# Patient Record
Sex: FEMALE
[De-identification: names, ages, dates, MRNs, and addresses within clinical notes are randomized per-mention and may not be internally consistent; named-entity substitution may affect disease eponyms.]

---

## 2022-03-31 ENCOUNTER — RESULT REVIEW (OUTPATIENT)
Age: 32
End: 2022-03-31

## 2024-04-25 PROBLEM — Z00.00 ENCOUNTER FOR PREVENTIVE HEALTH EXAMINATION: Status: ACTIVE | Noted: 2024-04-25

## 2024-05-03 ENCOUNTER — APPOINTMENT (OUTPATIENT)
Dept: ULTRASOUND IMAGING | Facility: CLINIC | Age: 34
End: 2024-05-03
Payer: COMMERCIAL

## 2024-05-03 ENCOUNTER — OUTPATIENT (OUTPATIENT)
Dept: OUTPATIENT SERVICES | Facility: HOSPITAL | Age: 34
LOS: 1 days | End: 2024-05-03

## 2024-05-03 ENCOUNTER — TRANSCRIPTION ENCOUNTER (OUTPATIENT)
Age: 34
End: 2024-05-03

## 2024-05-03 PROCEDURE — 76830 TRANSVAGINAL US NON-OB: CPT | Mod: 26

## 2024-05-03 PROCEDURE — 76856 US EXAM PELVIC COMPLETE: CPT | Mod: 26

## 2024-05-03 PROCEDURE — 76700 US EXAM ABDOM COMPLETE: CPT | Mod: 26

## 2024-05-06 ENCOUNTER — APPOINTMENT (OUTPATIENT)
Dept: OTOLARYNGOLOGY | Facility: CLINIC | Age: 34
End: 2024-05-06
Payer: COMMERCIAL

## 2024-05-06 VITALS — WEIGHT: 120 LBS | HEIGHT: 64 IN | BODY MASS INDEX: 20.49 KG/M2

## 2024-05-06 DIAGNOSIS — Z86.2 PERSONAL HISTORY OF DISEASES OF THE BLOOD AND BLOOD-FORMING ORGANS AND CERTAIN DISORDERS INVOLVING THE IMMUNE MECHANISM: ICD-10-CM

## 2024-05-06 DIAGNOSIS — L72.0 EPIDERMAL CYST: ICD-10-CM

## 2024-05-06 DIAGNOSIS — Z87.891 PERSONAL HISTORY OF NICOTINE DEPENDENCE: ICD-10-CM

## 2024-05-06 DIAGNOSIS — Z78.9 OTHER SPECIFIED HEALTH STATUS: ICD-10-CM

## 2024-05-06 DIAGNOSIS — K21.9 GASTRO-ESOPHAGEAL REFLUX DISEASE W/OUT ESOPHAGITIS: ICD-10-CM

## 2024-05-06 PROCEDURE — 99204 OFFICE O/P NEW MOD 45 MIN: CPT | Mod: 25

## 2024-05-06 PROCEDURE — 31575 DIAGNOSTIC LARYNGOSCOPY: CPT

## 2024-05-06 RX ORDER — FAMOTIDINE 20 MG/1
20 TABLET, FILM COATED ORAL
Qty: 90 | Refills: 0 | Status: ACTIVE | COMMUNITY
Start: 2024-05-06 | End: 1900-01-01

## 2024-05-06 RX ORDER — SERTRALINE HYDROCHLORIDE 25 MG/1
TABLET, FILM COATED ORAL
Refills: 0 | Status: ACTIVE | COMMUNITY

## 2024-05-06 RX ORDER — ALUMINUM HYDROXIDE AND MAGNESIUM CARBONATE 160; 105 MG/1; MG/1
160-105 TABLET, CHEWABLE ORAL
Qty: 100 | Refills: 3 | Status: ACTIVE | COMMUNITY
Start: 2024-05-06 | End: 1900-01-01

## 2024-05-07 NOTE — HISTORY OF PRESENT ILLNESS
[de-identified] : CC: tonsil cyst   HISTORY OF PRESENT ILLNESS: Ms. Trimble is a pleasant 33 year old lady with tonsil cyst issue for over a year, has seen other ENTs before and was told she has a tonsil stone she also reports significant throat burning and feeling of a lump in the neck nothing truly makes it better, but drinking cold beverages exacerbates her symptoms she has a history of GERD and took nexium before, not on any meds currently she's on zoloft and took a xanax prior to this appointment otherwise healthy   REVIEW OF SYSTEMS: General ROS: negative for - chills, fatigue or fever Psychological ROS: negative for - anxiety or depression Ophthalmic ROS: negative for - blurry vision, decreased vision or double vision ENT ROS: negative except as noted from HPI Allergy and Immunology ROS: negative except as noted from HPI Hematological and Lymphatic ROS: negative for - bleeding problems Endocrine ROS: negative for - malaise/lethargy Respiratory ROS: negative for - stridor Cardiovascular ROS: negative for - chest pain Gastrointestinal ROS: negative for - appetite loss or nausea/vomiting Genitourinary ROS: negative for - incontinence Musculoskeletal ROS: negative for - gait disturbance Neurological ROS: negative for - behavioral changes Dermatological ROS: negative for - nail changes   Physical Exam:   GENERAL APPEARANCE: Well-developed and No Acute Distress. COMMUNICATION: Able to Communicate. Strong Voice.   HEAD AND FACE Eyes: Testing of ocular motility including primary gaze alignment normal. Inspection and Appearance: No evidence of lesions or masses Palpation: Palpation of the face reveals no sinus tenderness Salivary Glands: Symmetric without masses Facial Strength: Symmetric without evidence of facial paralysis   EAR, NOSE, MOUTH, and THROAT: Ear Canals and Tympanic Membranes, Bilateral: No evidence of inflammation or lesions. Thresholds: Clinical speech reception thresholds normal. External, Nose and Auricle: No lesions or masses. Mouth: there is a inclusion cyst in the right mid tonsil maybe 3-5mm, not obstructive  NECK: Evaluation: No evidence of masses or crepitus. The neck is symmetric and the trachea is in the midline position. Thyroid: No evidence of enlargement, tenderness or mass. Neck Lymph Nodes: WNL. Respiratory: Inspection of the chest including symmetry, expansion and/or assessment of respiratory effort normal. Cardiovascular: Evaluation of peripheral vascular system by observation and palpation of capillary refill, normal. Neurological/Psychiatric: Alert, Oriented, Mood, and Affect Normal.   PROCEDURE: Flexible laryngoscopy with topical anesthesia (19402) ANESTHESIA: Topical with 4% Lidocaine  INDICATION FOR PROCEDURE: Unable to perform complete exam with mirror laryngoscopy  PREOPERATIVE DIAGNOSIS: flex  POSTOPERATIVE DIAGNOSIS: same as above  SURGEON: Baron Tanner MD   Prior to the procedure, I had a discussion with the patient regarding the risks, benefits, and alternatives of the procedure and a verbal consent was obtained.   INSTRUMENTS: Flexible scope   OPERATIVE PROCEDURE NOTE:  Patient was taken to the endoscopy suite where the nose and the pharynx were anesthetized with topical Pontocaine in a spray form. After adequate anesthesia of the nasal cavity and the pharynx, the fiberoptic endoscope was inserted through the nasal cavity. The palate was identified for patency. The nasopharynx, oropharynx, hypopharynx and the larynx were examined, along with the base of tongue. Structural examination of vocal cord movement was carried out and the larynx was examined during  phonation and deglutition. Gestures, such as cough, laughing and respiration were also performed to look for laryngeal abnormalities.  EXAM FINDINGS:  The nasal cavity mucosa was pink and normal.  No nasal cavity lesions or masses were observed.  No signs of infection or pus.  The nasopharynx was clear. No mass or lesion visualized.  The tongue was surface was found to be normal appearing.  No lesions or masses seen in the pharyngeal, hypopharyngeal, or laryngeal airway.  Mucosa appeared erythematous.  The vocal cords were mobile bilaterally.  Airway intact.  IMPRESSION: Ms. Trimble is a pleasant 33 year old lady with tonsil inclusion cyst, DNS to the right, LPR   PLAN: -reassurance -trial of pepcid/gaviscon, lifestyle changes (she drink quite a bit of coffee) -call back if symptoms persist will titrate to PPI     Baron Tanner MD [facility] Department of Otolaryngology- Head and Neck Surgery [facility]

## 2024-05-15 ENCOUNTER — RX RENEWAL (OUTPATIENT)
Age: 34
End: 2024-05-15

## 2024-06-26 ENCOUNTER — RX RENEWAL (OUTPATIENT)
Age: 34
End: 2024-06-26

## 2024-07-23 ENCOUNTER — ASOB RESULT (OUTPATIENT)
Age: 34
End: 2024-07-23

## 2024-07-23 ENCOUNTER — APPOINTMENT (OUTPATIENT)
Dept: ANTEPARTUM | Facility: CLINIC | Age: 34
End: 2024-07-23
Payer: COMMERCIAL

## 2024-07-23 PROCEDURE — 76830 TRANSVAGINAL US NON-OB: CPT

## 2024-07-23 PROCEDURE — 76856 US EXAM PELVIC COMPLETE: CPT

## 2024-09-18 ENCOUNTER — RX RENEWAL (OUTPATIENT)
Age: 34
End: 2024-09-18

## 2024-12-11 ENCOUNTER — RX RENEWAL (OUTPATIENT)
Age: 34
End: 2024-12-11